# Patient Record
Sex: MALE
[De-identification: names, ages, dates, MRNs, and addresses within clinical notes are randomized per-mention and may not be internally consistent; named-entity substitution may affect disease eponyms.]

---

## 2019-03-18 ENCOUNTER — APPOINTMENT (OUTPATIENT)
Dept: ORTHOPEDIC SURGERY | Facility: CLINIC | Age: 59
End: 2019-03-18
Payer: MEDICAID

## 2019-04-01 ENCOUNTER — APPOINTMENT (OUTPATIENT)
Dept: ORTHOPEDIC SURGERY | Facility: CLINIC | Age: 59
End: 2019-04-01
Payer: MEDICAID

## 2019-04-01 VITALS
BODY MASS INDEX: 38.8 KG/M2 | WEIGHT: 256 LBS | SYSTOLIC BLOOD PRESSURE: 181 MMHG | HEART RATE: 54 BPM | DIASTOLIC BLOOD PRESSURE: 98 MMHG | HEIGHT: 68 IN

## 2019-04-01 DIAGNOSIS — Z82.49 FAMILY HISTORY OF ISCHEMIC HEART DISEASE AND OTHER DISEASES OF THE CIRCULATORY SYSTEM: ICD-10-CM

## 2019-04-01 DIAGNOSIS — Z78.9 OTHER SPECIFIED HEALTH STATUS: ICD-10-CM

## 2019-04-01 DIAGNOSIS — Z87.39 PERSONAL HISTORY OF OTHER DISEASES OF THE MUSCULOSKELETAL SYSTEM AND CONNECTIVE TISSUE: ICD-10-CM

## 2019-04-01 DIAGNOSIS — Z86.79 PERSONAL HISTORY OF OTHER DISEASES OF THE CIRCULATORY SYSTEM: ICD-10-CM

## 2019-04-01 DIAGNOSIS — M48.07 SPINAL STENOSIS, LUMBOSACRAL REGION: ICD-10-CM

## 2019-04-01 DIAGNOSIS — Z86.39 PERSONAL HISTORY OF OTHER ENDOCRINE, NUTRITIONAL AND METABOLIC DISEASE: ICD-10-CM

## 2019-04-01 DIAGNOSIS — M43.16 SPONDYLOLISTHESIS, LUMBAR REGION: ICD-10-CM

## 2019-04-01 DIAGNOSIS — Z86.69 PERSONAL HISTORY OF OTHER DISEASES OF THE NERVOUS SYSTEM AND SENSE ORGANS: ICD-10-CM

## 2019-04-01 PROCEDURE — 72100 X-RAY EXAM L-S SPINE 2/3 VWS: CPT

## 2019-04-01 PROCEDURE — 99204 OFFICE O/P NEW MOD 45 MIN: CPT

## 2019-04-01 NOTE — HISTORY OF PRESENT ILLNESS
[Bending] : worsened by bending [Heat] : relieved by heat [NSAIDs] : relieved by nonsteroidal anti-inflammatory drugs [Rest] : relieved by rest [Prolonged Sitting] : worsened by prolonged sitting [Prolonged Standing] : worsened by prolonged standing [Walking] : worsened by walking [4] : an average pain level of 4/10 [2] : a minimum pain level of 2/10 [6] : a maximum pain level of 6/10 [Constant] : ~He/She~ states the symptoms seem to be constant [de-identified] : Pt presents with c/o low back pain for several years, progressively worsening over time, described as sharp,dull. Pain radiates to posterior aspect of B/L LE, associated with numbness,and tingling involving feet affecting all toes, Pt also c/o mild weakness on B/ LE. Pt had several LESI, last injection 8 yrs ago, this provided mild relief in pain for a few weeks. Pt does not participated with PT at this time, Had PT 5 yrs ago, this provided no relief in pain.Pt denies injury/falls. Pt had  lumbar spine MRI done.  [Recumbency] : not relieved by recumbency [Incontinence] : no incontinence [Urinary Ret.] : no urinary retention

## 2019-04-01 NOTE — DISCUSSION/SUMMARY
[de-identified] : 60 yo male with lumbar spondylolisthesis L5S1, with lumbar stenosis L3-L5, recommend PT, pain consult. Dr. Raphael referal.

## 2019-04-01 NOTE — CONSULT LETTER
[Dear  ___] : Dear  [unfilled], [Consult Letter:] : I had the pleasure of evaluating your patient, [unfilled]. [Please see my note below.] : Please see my note below. [Consult Closing:] : Thank you very much for allowing me to participate in the care of this patient.  If you have any questions, please do not hesitate to contact me. [Sincerely,] : Sincerely, [FreeTextEntry3] : Rene Schumacher MD

## 2019-04-01 NOTE — PHYSICAL EXAM
[Normal] : Oriented to person, place, and time, insight and judgement were intact and the affect was normal [] : Motor: [UE Motor Strength NL] : Motor strength of the bilateral upper extremities is normal [Motor Strength Lower Extremities] : bilaterally weak [NL] : normal and symmetric bilaterally [UE/LE] : Sensory: Intact in bilateral upper & lower extremities [ALL] : Biceps, brachioradialis, triceps, patellar, ankle and plantar 2+ and symmetric bilaterally [Gomez's Sign] : negative Gomez's sign [Pronator Drift] : negative pronator drift [SLR] : negative straight leg raise [de-identified] : Lumbar ROM; Limited, painful\par NTTP L spine and b/l paraspinals L region. \par Skin intact L spine. No rashes, ulcers, blisters. \par No lymphedema. \par Rapid alternating movements- Intact. \par Full and non-painful ROM RUE, LUE, RLE, and LLE. Skin intact RUE, LUE, RLE, and LLE. No rashes, blisters, ulcers. NTTP RUE, LUE, RLE, and LLE. No evidence of dislocation or subluxation B/L.\par  [de-identified] : 2 views AP and Lat of LS Spine from [id number] 04/01/19. Read and dictated by Dr. Rene Schumacher Board Certified orthopaedic surgeon  L5S1 Spondylolisthesis\par \par \par MRI Tom Caceres 01/29/19: Multilevel DDD with herniations and stenoses L2-S1

## 2019-04-01 NOTE — REASON FOR VISIT
[Initial Visit] : an initial visit for [Back Pain] : back pain [FreeTextEntry2] : Recommended for evaluation by Dr Rissa Hernandez